# Patient Record
Sex: FEMALE | Race: WHITE | Employment: OTHER | URBAN - METROPOLITAN AREA
[De-identification: names, ages, dates, MRNs, and addresses within clinical notes are randomized per-mention and may not be internally consistent; named-entity substitution may affect disease eponyms.]

---

## 2017-02-12 ENCOUNTER — HOSPITAL ENCOUNTER (OUTPATIENT)
Age: 57
Discharge: HOME | End: 2017-02-12
Attending: ORTHOPAEDIC SURGERY
Payer: COMMERCIAL

## 2017-02-12 DIAGNOSIS — M50.10 CERVICAL DISC DISORDER WITH RADICULOPATHY: ICD-10-CM

## 2017-02-12 PROCEDURE — 72141 MRI NECK SPINE W/O DYE: CPT

## 2017-03-16 ENCOUNTER — HOSPITAL ENCOUNTER (OUTPATIENT)
Age: 57
Setting detail: OUTPATIENT SURGERY
Discharge: HOME | End: 2017-03-16
Attending: ANESTHESIOLOGY | Admitting: ANESTHESIOLOGY
Payer: COMMERCIAL

## 2017-03-16 ENCOUNTER — SURGERY (OUTPATIENT)
Age: 57
End: 2017-03-16

## 2017-03-16 VITALS
WEIGHT: 200 LBS | RESPIRATION RATE: 14 BRPM | HEIGHT: 61 IN | TEMPERATURE: 99.1 F | OXYGEN SATURATION: 96 % | HEART RATE: 87 BPM | BODY MASS INDEX: 37.76 KG/M2 | DIASTOLIC BLOOD PRESSURE: 89 MMHG | SYSTOLIC BLOOD PRESSURE: 147 MMHG

## 2017-03-16 PROCEDURE — 64413 HC INJ ANES CERVICAL PLEXUS: CPT | Performed by: ANESTHESIOLOGY

## 2017-03-16 PROCEDURE — 74011250636 HC RX REV CODE- 250/636: Performed by: ANESTHESIOLOGY

## 2017-03-16 PROCEDURE — 77030014125 HC TY EPDRL BBMI -B: Performed by: ANESTHESIOLOGY

## 2017-03-16 RX ORDER — METHYLPREDNISOLONE ACETATE 80 MG/ML
80 INJECTION, SUSPENSION INTRA-ARTICULAR; INTRALESIONAL; INTRAMUSCULAR; SOFT TISSUE ONCE
Status: COMPLETED | OUTPATIENT
Start: 2017-03-16 | End: 2017-03-16

## 2017-03-16 RX ADMIN — METHYLPREDNISOLONE ACETATE 80 MG: 80 INJECTION, SUSPENSION INTRA-ARTICULAR; INTRALESIONAL; INTRAMUSCULAR; SOFT TISSUE at 08:08

## 2017-03-16 NOTE — DISCHARGE SUMMARY
Cervical Epidural Steroid Injection: sitting, C7-T1, preped with betadine, 18 ga epidural needle, YULISA at 6 cm, CSF/BLOOD negative, 6 ml NS with 80 mg of Depomedrol injected through the epidural needle, tolerated well, no complication. D/C home after 30 min observation.

## 2017-03-16 NOTE — IP AVS SNAPSHOT
Current Discharge Medication List  
  
ASK your doctor about these medications Dose & Instructions Dispensing Information Comments Morning Noon Evening Bedtime  
 amLODIPine 10 mg tablet Commonly known as:  Oliverio Nuñez Your last dose was: Your next dose is: Take  by mouth daily. Refills:  0 CeleBREX 50 mg capsule Generic drug:  Celecoxib Your last dose was: Your next dose is:    
   
   
 Dose:  50 mg Take 50 mg by mouth two (2) times a day. Refills:  0  
     
   
   
   
  
 cyanocobalamin 500 mcg tablet Commonly known as:  VITAMIN B12 Your last dose was: Your next dose is:    
   
   
 Dose:  500 mcg Take 500 mcg by mouth daily. Refills:  0 DEPO-PROVERA 150 mg/mL injection Generic drug:  medroxyPROGESTERone Your last dose was: Your next dose is:    
   
   
 Dose:  150 mg  
150 mg by IntraMUSCular route once. Refills:  0  
     
   
   
   
  
 diclofenac EC 75 mg EC tablet Commonly known as:  VOLTAREN Your last dose was: Your next dose is:    
   
   
  Refills:  0  
     
   
   
   
  
 metaxalone 800 mg tablet Commonly known as:  SKELAXIN Your last dose was: Your next dose is:    
   
   
  Refills:  0  
     
   
   
   
  
 omeprazole 20 mg capsule Commonly known as:  PRILOSEC Your last dose was: Your next dose is:    
   
   
 Dose:  20 mg Take 20 mg by mouth daily. Refills:  0 PREMARIN 0.45 mg tablet Generic drug:  estrogens (conjugated) Your last dose was: Your next dose is: Take  by mouth. Refills:  0 PRISTIQ 50 mg tablet Generic drug:  Desvenlafaxine SR Your last dose was: Your next dose is:    
   
   
 Dose:  50 mg Take 50 mg by mouth. Refills:  0 SYNTHROID 88 mcg tablet Generic drug:  levothyroxine Your last dose was: Your next dose is: Take  by mouth Daily (before breakfast). Refills:  0  
     
   
   
   
  
 traZODone 50 mg tablet Commonly known as:  [place] Your last dose was: Your next dose is:    
   
   
 nightly. Refills:  0

## 2017-03-16 NOTE — PROCEDURES
OPERATIVE NOTE    Patient: Carol Segovia MRN: [id number]  SSN: [id number]    YOB: 1960  Age: 49 y.o. Sex: female        Preoperative Diagnosis: m50.10    Patient is a 49 y.o. female with a history of   Past Medical History:   Diagnosis Date    Acid reflux     Asthma     Cervical neck pain with evidence of disc disease     Flank pain     Kidney stone     Thyroid condition     who presents with neck pain. .      Exam: Shows normal sensation and motor function in the upper extremities. Risks and benefits of Cervical  7 epidural steroid injections were discussed with the patient who consented to proceed. Date of Procedure: 3/16/2017     Surgeon(s): Surgeon(s) and Role:     * Ivelisse Paige MD - Primary    Procedure: Procedure(s):  CERVICAL EPIDURAL  INJECTION  c4-c5 Time out performed. Corrct site and procedure identified. Monitors applied. Placed sitting  chlorhexidine prep and sterile draping applied to the area. Local-1% Lidocaine to skin and subcutaneous tissue C7-T1  With 18 gauge Tuohy needle was directed to Epidural space by loss of resistance to air. No CSF,Heme. 80 mg Depo-medrol suspended in 6 ml of preservative free Normal Saline was injected through the needle. Patient tolerated the procedure well and Vital signs were stable throughout procedure. Patient was then transferred to Phase 2 recovery for observation in stable condition. No complications were noted.     Ivelisse Paige MD  3/16/2017  8:20 AM

## 2017-03-16 NOTE — H&P
Surgery History and Physical    Subjective:      Gretchen Whitten is a 49 y.o. female who presents for evaluation of neck pain. The pain is described as sharp and is 9/10 in intensity today with occ numbness/tingling on the right fingers. . Onset was 18 year ago and had a cervical fusion that time. Symptoms have been getting worse for last six months. Past Medical History:   Diagnosis Date    Acid reflux     Asthma     Cervical neck pain with evidence of disc disease     Flank pain     Kidney stone     Thyroid condition      Past Surgical History:   Procedure Laterality Date    CHEST SURGERY PROCEDURE UNLISTED      pt denies    HX CERVICAL FUSION  2001    HX GASTRIC BYPASS  1998 & 2009    HX GASTRIC BYPASS      HX HERNIA REPAIR  2011    HX TONSILLECTOMY      HX UROLOGICAL  11/12/2014    Cystoscopy, left retrograde pyelogram and left ureteral stent placement    HX UROLOGICAL  11/18/2014    Cystoscopy, bilateral retrograde pyelograms, left ureteroscopy with basketextraction of stones and left ureteral stent placement      Family History   Problem Relation Age of Onset    Heart Disease Father     Hypertension Father      Social History   Substance Use Topics    Smoking status: Never Smoker    Smokeless tobacco: Not on file    Alcohol use Yes      Comment: rare      Prior to Admission medications    Medication Sig Start Date End Date Taking? Authorizing Provider   amLODIPine (NORVASC) 10 mg tablet Take  by mouth daily. Yes Historical Provider   trazodone (DESYREL) 50 mg tablet nightly. 10/3/14  Yes Historical Provider   estrogens, conjugated, (PREMARIN) 0.45 mg tablet Take  by mouth. Yes Historical Provider   levothyroxine (SYNTHROID) 88 mcg tablet Take  by mouth Daily (before breakfast). Yes Historical Provider   Desvenlafaxine SR (PRISTIQ) 50 mg tablet Take 50 mg by mouth. Yes Historical Provider   omeprazole (PRILOSEC) 20 mg capsule Take 20 mg by mouth daily.    Yes Historical Provider cyanocobalamin (VITAMIN B12) 500 mcg tablet Take 500 mcg by mouth daily. Yes Historical Provider   diclofenac EC (VOLTAREN) 75 mg EC tablet  17   Historical Provider   metaxalone (SKELAXIN) 800 mg tablet  10/28/14   Historical Provider   medroxyPROGESTERone (DEPO-PROVERA) 150 mg/mL injection 150 mg by IntraMUSCular route once. Historical Provider   Celecoxib (CELEBREX) 50 mg capsule Take 50 mg by mouth two (2) times a day. Historical Provider      Allergies   Allergen Reactions    Shellfish Derived Nausea and Vomiting     Oysters only    Aspirin Other (comments)    Codeine Nausea Only       Review of Systems:  A comprehensive review of systems was negative except for that written in the History of Present Illness. Objective:      Patient Vitals for the past 8 hrs:   BP Temp Pulse SpO2 Height Weight   17 0800 (!) 161/97 37.3 °C (99.1 °F) 99 98 % 5' 1\" (1.549 m) 90.7 kg (200 lb)       Temp (24hrs), Av.3 °C (99.1 °F), Min:37.3 °C (99.1 °F), Max:37.3 °C (99.1 °F)      Physical Exam:  GENERAL: alert, cooperative, no distress, appears stated age    Assessment:   Cervical Stenosis, failed neck surgery syndrome. stable  Plan:   Cervical epidural steroid injection, #1. Discussed the risk of surgery including headache. Bleeding. Nerve injury,  and the risks of general anesthetic. The patient understands the risks; any and all questions were answered to the patient's satisfaction.     Signed By: Patricia Mckeon MD     2017

## 2017-03-16 NOTE — DISCHARGE INSTRUCTIONS
Patient armband removed and given to patient to take home. Patient was informed of the privacy risks if armband lost or stolen     Myelogram: About This Test  What is it? A myelogram uses X-rays and a special dye to make pictures of bones and nerves of the spine (spinal canal). The spinal canal holds the spinal cord, the spinal nerve roots, and the fluid-filled space between the bones in your spine. Why is this test done? A myelogram is done to check for:  · The cause of arm or leg numbness, weakness, or pain. · Narrowing of the spinal canal (spinal stenosis). · A tumor or infection causing problems with the spinal cord or nerve roots. · A spinal disc that has ruptured (herniated disc). · Inflammation of the membrane that covers the brain and spinal cord. · Problems with the blood vessels to the spine. How can you prepare for the test?  Your doctor will tell you if you need to change how much you eat and drink before the myelogram. You may be asked to increase the amount of water you drink before the test. Follow the instructions your doctor gives you about eating and drinking. Before a myelogram, tell your doctor if:  · You are allergic to any medicines, contrast material, or iodine dye. · You have had bleeding problems, or you take a blood thinner, such as aspirin, clopidogrel (Plavix), or warfarin (Coumadin), or you take over-the-counter medicines, such as ibuprofen (Advil, Motrin) or naproxen (Aleve). Your doctor will tell you when you should stop taking these medicines several days before your procedure. Make sure that you understand exactly what he or she wants you to do. · You have had kidney problems. · You have diabetes, especially if you take metformin (Glucophage, for example). · You are or might be pregnant. Ask someone to take you home and stay with you after the test.  What happens during the test?  The dye is put into your spinal canal with a thin needle.  This is called a lumbar puncture. The dye moves through the space so the nerve roots and spinal cord can be seen more clearly. After the dye is put in, you will lie still while the X-ray pictures are taken. How does it feel? You will feel a quick sting from a small needle that has medicine to numb the skin on your back. You will also feel some pressure as the long, thin spinal needle is put into your spinal canal. You may feel a quick sharp pain down your buttock or leg when the needle is moved in your spine. The dye may make you feel warm and flushed and have a metallic taste in your mouth. What else should you know about the test?  In rare cases, the hole made by the needle in the sac around the spine does not close normally. This can allow spinal fluid to leak out. This leak may need to be repaired through a procedure called an epidural blood patch. To do the patch, your doctor injects some of your own blood to cover the hole. How long does the test take? · A myelogram usually takes 30 minutes to 1 hour. What happens after the test?  · You will probably be able to go home 30 minutes to 2 hours after the test.  · You may need to lie in bed with your head raised for 4 to 24 hours after the test. To prevent seizures, which are a rare side effect, do not bend over or lie down with your head lower than your body. Keeping your head higher than your body after a myelogram also may help prevent or reduce other side effects, such as headache, nausea, or vomiting. · Avoid strenuous activity, such as running or heavy lifting, for at least 1 day after your myelogram.  · Drink plenty of water after the myelogram. Your doctor will give you instructions on taking your regular medicines. When should you call for help? Call 911 anytime you think you may need emergency care. For example, call if:  · You have a seizure.   Call your doctor now or seek immediate medical care if:  · You have any increase in pain, weakness, or numbness in your legs.  · You have a severe headache or stiff neck, or your eyes become very sensitive to light. · You have a headache that lasts longer than 24 hours. · You have problems urinating or having a bowel movement. · You have a fever. Follow-up care is a key part of your treatment and safety. Be sure to make and go to all appointments, and call your doctor if you are having problems. It's also a good idea to keep a list of the medicines you take. Ask your doctor when you can expect to have your test results. Where can you learn more? Go to http://kevin-bhargavi.info/. Enter W537 in the search box to learn more about \"Myelogram: About This Test.\"  Current as of: August 15, 2016  Content Version: 11.1  © 2642-5005 [employer]. Care instructions adapted under license by "[employer] (which disclaims liability or warranty for this information). If you have questions about a medical condition or this instruction, always ask your healthcare professional. [place] [address] any warranty or liability for your use of this information. DISCHARGE SUMMARY from Nurse    The following personal items are in your possession at time of discharge:    Dental Appliances: None  Visual Aid: None     Home Medications: None  Jewelry: Earrings  Clothing: Pants, Shirt, Footwear, Socks, Undergarments  Other Valuables: None (all belongings kept at bedisde)             PATIENT INSTRUCTIONS:    After general anesthesia or intravenous sedation, for 24 hours or while taking prescription Narcotics:  · Limit your activities  · Do not drive and operate hazardous machinery  · Do not make important personal or business decisions  · Do  not drink alcoholic beverages  · If you have not urinated within 8 hours after discharge, please contact your surgeon on call.     Report the following to your surgeon:  · Excessive pain, swelling, redness or odor of or around the surgical area  · Temperature over 100.5  · Nausea and vomiting lasting longer than 4 hours or if unable to take medications  · Any signs of decreased circulation or nerve impairment to extremity: change in color, persistent  numbness, tingling, coldness or increase pain  · Any questions        What to do at Home:  Recommended activity: Activity as tolerated and no driving for today,     *  Please give a list of your current medications to your Primary Care Provider. *  Please update this list whenever your medications are discontinued, doses are      changed, or new medications (including over-the-counter products) are added. *  Please carry medication information at all times in case of emergency situations. These are general instructions for a healthy lifestyle:    No smoking/ No tobacco products/ Avoid exposure to second hand smoke    Surgeon General's Warning:  Quitting smoking now greatly reduces serious risk to your health. Obesity, smoking, and sedentary lifestyle greatly increases your risk for illness    A healthy diet, regular physical exercise & weight monitoring are important for maintaining a healthy lifestyle    You may be retaining fluid if you have a history of heart failure or if you experience any of the following symptoms:  Weight gain of 3 pounds or more overnight or 5 pounds in a week, increased swelling in our hands or feet or shortness of breath while lying flat in bed. Please call your doctor as soon as you notice any of these symptoms; do not wait until your next office visit. Recognize signs and symptoms of STROKE:    F-face looks uneven    A-arms unable to move or move unevenly    S-speech slurred or non-existent    T-time-call 911 as soon as signs and symptoms begin-DO NOT go       Back to bed or wait to see if you get better-TIME IS BRAIN. Warning Signs of HEART ATTACK     Call 911 if you have these symptoms:   Chest discomfort.  Most heart attacks involve discomfort in the center of the chest that lasts more than a few minutes, or that goes away and comes back. It can feel like uncomfortable pressure, squeezing, fullness, or pain.  Discomfort in other areas of the upper body. Symptoms can include pain or discomfort in one or both arms, the back, neck, jaw, or stomach.  Shortness of breath with or without chest discomfort.  Other signs may include breaking out in a cold sweat, nausea, or lightheadedness. Don't wait more than five minutes to call 911 - MINUTES MATTER! Fast action can save your life. Calling 911 is almost always the fastest way to get lifesaving treatment. Emergency Medical Services staff can begin treatment when they arrive -- up to an hour sooner than if someone gets to the hospital by car. The discharge information has been reviewed with the patient. The patient verbalized understanding. Discharge medications reviewed with the patient and appropriate educational materials and side effects teaching were provided.

## 2017-03-16 NOTE — IP AVS SNAPSHOT
Bam Vasquez 
 
 
 [address] [place] [address] Patient: Elver Martinez MRN: [id number] VHD:[id number] You are allergic to the following Allergen Reactions Shellfish Derived Nausea and Vomiting Oysters only Aspirin Other (comments) Codeine Nausea Only Recent Documentation Height Weight BMI OB Status Smoking Status 1.549 m 90.7 kg 37.79 kg/m2 Postmenopausal Never Smoker Emergency Contacts Name Discharge Info Relation Home Work Mobile bi[employer]GIVER [3] Spouse [3] [phone] Lindy April  Spouse [3] [phone] About your hospitalization You were admitted on:  March 16, 2017 You last received care in the:  [address] [facility] [address] You were discharged on:  March 16, 2017 Unit phone number:  [phone] Why you were hospitalized Your primary diagnosis was:  Not on File Providers Seen During Your Hospitalizations Provider Role Specialty Primary office phone Yamile Black MD Attending Provider Anesthesiology [phone] Your Primary Care Physician (PCP) Primary Care Physician Office Phone Office Fax Lynn Bunch [phone] Follow-up Information Follow up With Details Comments Contact Info Adair Carrero MD   [address] [address] 
[phone] Your Appointments Monday April 10, 2017 10:45 AM EDT  
ESTABLISHED PATIENT with Allean Castleman, MD  
[facility] [place] [facility]) [address] [place] [address]  
[phone] Current Discharge Medication List  
  
ASK your doctor about these medications Dose & Instructions Dispensing Information Comments Morning Noon Evening Bedtime  
 amLODIPine 10 mg tablet Commonly known as:  [place] Your last dose was: Your next dose is: Take  by mouth daily. Refills:  0 CeleBREX 50 mg capsule Generic drug:  Celecoxib Your last dose was: Your next dose is:    
   
   
 Dose:  50 mg Take 50 mg by mouth two (2) times a day. Refills:  0  
     
   
   
   
  
 cyanocobalamin 500 mcg tablet Commonly known as:  VITAMIN B12 Your last dose was: Your next dose is:    
   
   
 Dose:  500 mcg Take 500 mcg by mouth daily. Refills:  0 DEPO-PROVERA 150 mg/mL injection Generic drug:  medroxyPROGESTERone Your last dose was: Your next dose is:    
   
   
 Dose:  150 mg  
150 mg by IntraMUSCular route once. Refills:  0  
     
   
   
   
  
 diclofenac EC 75 mg EC tablet Commonly known as:  VOLTAREN Your last dose was: Your next dose is:    
   
   
  Refills:  0  
     
   
   
   
  
 metaxalone 800 mg tablet Commonly known as:  SKELAXIN Your last dose was: Your next dose is:    
   
   
  Refills:  0  
     
   
   
   
  
 omeprazole 20 mg capsule Commonly known as:  PRILOSEC Your last dose was: Your next dose is:    
   
   
 Dose:  20 mg Take 20 mg by mouth daily. Refills:  0 PREMARIN 0.45 mg tablet Generic drug:  estrogens (conjugated) Your last dose was: Your next dose is: Take  by mouth. Refills:  0 PRISTIQ 50 mg tablet Generic drug:  Desvenlafaxine SR Your last dose was: Your next dose is:    
   
   
 Dose:  50 mg Take 50 mg by mouth. Refills:  0  
     
   
   
   
  
 SYNTHROID 88 mcg tablet Generic drug:  levothyroxine Your last dose was: Your next dose is: Take  by mouth Daily (before breakfast). Refills:  0  
     
   
   
   
  
 traZODone 50 mg tablet Commonly known as:  Jace Hay Your last dose was: Your next dose is:    
   
   
 nightly. Refills:  0 Discharge Instructions Patient armband removed and given to patient to take home. Patient was informed of the privacy risks if armband lost or stolen Myelogram: About This Test 
What is it? A myelogram uses X-rays and a special dye to make pictures of bones and nerves of the spine (spinal canal). The spinal canal holds the spinal cord, the spinal nerve roots, and the fluid-filled space between the bones in your spine. Why is this test done? A myelogram is done to check for: · The cause of arm or leg numbness, weakness, or pain. · Narrowing of the spinal canal (spinal stenosis). · A tumor or infection causing problems with the spinal cord or nerve roots. · A spinal disc that has ruptured (herniated disc). · Inflammation of the membrane that covers the brain and spinal cord. · Problems with the blood vessels to the spine. How can you prepare for the test? 
Your doctor will tell you if you need to change how much you eat and drink before the myelogram. You may be asked to increase the amount of water you drink before the test. Follow the instructions your doctor gives you about eating and drinking. Before a myelogram, tell your doctor if: 
· You are allergic to any medicines, contrast material, or iodine dye. · You have had bleeding problems, or you take a blood thinner, such as aspirin, clopidogrel (Plavix), or warfarin (Coumadin), or you take over-the-counter medicines, such as ibuprofen (Advil, Motrin) or naproxen (Aleve). Your doctor will tell you when you should stop taking these medicines several days before your procedure. Make sure that you understand exactly what he or she wants you to do. · You have had kidney problems. · You have diabetes, especially if you take metformin (Glucophage, for example). · You are or might be pregnant. Ask someone to take you home and stay with you after the test. 
What happens during the test? 
The dye is put into your spinal canal with a thin needle. This is called a lumbar puncture. The dye moves through the space so the nerve roots and spinal cord can be seen more clearly. After the dye is put in, you will lie still while the X-ray pictures are taken. How does it feel? You will feel a quick sting from a small needle that has medicine to numb the skin on your back. You will also feel some pressure as the long, thin spinal needle is put into your spinal canal. You may feel a quick sharp pain down your buttock or leg when the needle is moved in your spine. The dye may make you feel warm and flushed and have a metallic taste in your mouth. What else should you know about the test? 
In rare cases, the hole made by the needle in the sac around the spine does not close normally. This can allow spinal fluid to leak out. This leak may need to be repaired through a procedure called an epidural blood patch. To do the patch, your doctor injects some of your own blood to cover the hole. How long does the test take? · A myelogram usually takes 30 minutes to 1 hour. What happens after the test? 
· You will probably be able to go home 30 minutes to 2 hours after the test. 
· You may need to lie in bed with your head raised for 4 to 24 hours after the test. To prevent seizures, which are a rare side effect, do not bend over or lie down with your head lower than your body. Keeping your head higher than your body after a myelogram also may help prevent or reduce other side effects, such as headache, nausea, or vomiting. · Avoid strenuous activity, such as running or heavy lifting, for at least 1 day after your myelogram. 
· Drink plenty of water after the myelogram. Your doctor will give you instructions on taking your regular medicines. When should you call for help? Call 911 anytime you think you may need emergency care. For example, call if: 
· You have a seizure. Call your doctor now or seek immediate medical care if: 
· You have any increase in pain, weakness, or numbness in your legs. · You have a severe headache or stiff neck, or your eyes become very sensitive to light. · You have a headache that lasts longer than 24 hours. · You have problems urinating or having a bowel movement. · You have a fever. Follow-up care is a key part of your treatment and safety. Be sure to make and go to all appointments, and call your doctor if you are having problems. It's also a good idea to keep a list of the medicines you take. Ask your doctor when you can expect to have your test results. Where can you learn more? Go to http://kevin-bhargavi.info/. Enter P770 in the search box to learn more about \"Myelogram: About This Test.\" Current as of: August 15, 2016 Content Version: 11.1 © 4188-3494 [employer]wise[employer]orporated. Care instructions adapted under license by [employer] (which disclaims liability or warranty for this information). If you have questions about a medical condition or this instruction, always ask your healthcare professional. [place] [address] any warranty or liability for your use of this information. DISCHARGE SUMMARY from Nurse The following personal items are in your possession at time of discharge: 
 
Dental Appliances: None Visual Aid: None Home Medications: None Jewelry: Kraig July Clothing: Pants, Shirt, Footwear, Socks, Undergarments Other Valuables: None (all belongings kept at [facility]) PATIENT INSTRUCTIONS: 
 
 
F-face looks uneven A-arms unable to move or move unevenly S-speech slurred or non-existent T-time-call 911 as soon as signs and symptoms begin-DO NOT go Back to bed or wait to see if you get better-TIME IS BRAIN. Warning Signs of HEART ATTACK Call 911 if you have these symptoms: 
? Chest discomfort. Most heart attacks involve discomfort in the center of the chest that lasts more than a few minutes, or that goes away and comes back. It can feel like uncomfortable pressure, squeezing, fullness, or pain. ? Discomfort in other areas of the upper body. Symptoms can include pain or discomfort in one or both arms, the back, neck, jaw, or stomach. ? Shortness of breath with or without chest discomfort. ? Other signs may include breaking out in a cold sweat, nausea, or lightheadedness. Don't wait more than five minutes to call [address]! Fast action can save your life. Calling 911 is almost always the fastest way to get lifesaving treatment. Emergency Medical Services staff can begin treatment when they arrive  up to an hour sooner than if someone gets to the hospital by car. The discharge information has been reviewed with the patient. The patient verbalized understanding. Discharge medications reviewed with the patient and appropriate educational materials and side effects teaching were provided. Discharge Orders None [email or web address] Announcement We are excited to announce that we are making your provider's discharge notes available to you in [email or web address]. You will see these notes when they are completed and signed by the physician that discharged you from your recent hospital stay. If you have any questions or concerns about any information you see in Dish[employer]fm, please call the Health Information Department where you were seen or reach out to your Primary Care Provider for more information about your plan of care. Introducing [facility]!    
 [employer] introduces D[employer] patient portal. Now you can access parts of your medical record, email your doctor's office, and request medication refills online. 1. In your internet browser, go to [email or web address] 2. Click on the First Time User? Click Here link in the Sign In box. You will see the New Member Sign Up page. 3. Enter your The RealReal Access Code exactly as it appears below. You will not need to use this code after youve completed the sign-up process. If you do not sign up before the expiration date, you must request a new code. · The Real[employer] Access Code: [email or web address] Expires: 5/18/2017  1:32 PM 
 
4. Enter the last four digits of your Social Security Number (xxxx) and Date of Birth (mm/dd/yyyy) as indicated and click Submit. You will be taken to the next sign-up page. 5. Create a The RealReal ID. This will be your The RealReal login ID and cannot be changed, so think of one that is secure and easy to remember. 6. Create a The RealReal password. You can change your password at any time. 7. Enter your Password Reset Question and Answer. This can be used at a later time if you forget your password. 8. Enter your e-mail address. You will receive e-mail notification when new information is available in [address] 9. Click Sign Up. You can now view and download portions of your medical record. 10. Click the Download Summary menu link to download a portable copy of your medical information. If you have questions, please visit the Frequently Asked Questions section of the The RealReal website. Remember, The RealReal is NOT to be used for urgent needs. For medical emergencies, dial 911. Now available from your iPhone and Android! General Information Please provide this summary of care documentation to your next provider. Patient Signature:  ____________________________________________________________ Date:  ____________________________________________________________  
  
[facility]  Provider Signature: ____________________________________________________________ Date:  ____________________________________________________________

## 2017-03-16 NOTE — PERIOP NOTES
, Jamal Melo, ok to speak to. Handoff report given to LATHA Zuleta. Informed pt still needs pre procedure VS recorded.

## 2017-05-18 ENCOUNTER — HOSPITAL ENCOUNTER (OUTPATIENT)
Dept: GENERAL RADIOLOGY | Age: 57
Discharge: HOME | End: 2017-05-18
Payer: COMMERCIAL

## 2017-05-18 DIAGNOSIS — J18.9 PNEUMONIA, ORGANISM UNSPECIFIED(486): ICD-10-CM

## 2017-05-18 PROCEDURE — 71020 XR CHEST PA LAT: CPT

## 2017-09-20 ENCOUNTER — HOSPITAL ENCOUNTER (OUTPATIENT)
Dept: LAB | Age: 57
Discharge: HOME | End: 2017-09-20
Payer: COMMERCIAL

## 2017-09-20 PROCEDURE — 80307 DRUG TEST PRSMV CHEM ANLYZR: CPT

## 2017-09-25 LAB
Lab: NORMAL
REFERENCE LAB,REFLB: NORMAL
TEST DESCRIPTION:,ATST: NORMAL

## 2018-01-24 RX ORDER — ERGOCALCIFEROL 1.25 MG/1
50000 CAPSULE ORAL
COMMUNITY

## 2018-01-24 RX ORDER — HYDROCHLOROTHIAZIDE 12.5 MG/1
12.5 TABLET ORAL DAILY
COMMUNITY

## 2018-01-24 RX ORDER — HYDROCODONE BITARTRATE AND ACETAMINOPHEN 5; 325 MG/1; MG/1
TABLET ORAL
COMMUNITY

## 2018-01-25 ENCOUNTER — ANESTHESIA EVENT (OUTPATIENT)
Dept: SURGERY | Age: 58
End: 2018-01-25

## 2018-01-25 ENCOUNTER — ANESTHESIA (OUTPATIENT)
Dept: SURGERY | Age: 58
End: 2018-01-25

## 2018-01-25 ENCOUNTER — HOSPITAL ENCOUNTER (OUTPATIENT)
Age: 58
Setting detail: OUTPATIENT SURGERY
Discharge: HOME | End: 2018-01-25
Attending: ANESTHESIOLOGY | Admitting: ANESTHESIOLOGY
Payer: COMMERCIAL

## 2018-01-25 VITALS
TEMPERATURE: 98.4 F | HEART RATE: 70 BPM | HEIGHT: 61 IN | OXYGEN SATURATION: 97 % | RESPIRATION RATE: 16 BRPM | SYSTOLIC BLOOD PRESSURE: 157 MMHG | WEIGHT: 207 LBS | DIASTOLIC BLOOD PRESSURE: 82 MMHG | BODY MASS INDEX: 39.08 KG/M2

## 2018-01-25 PROCEDURE — 74011250636 HC RX REV CODE- 250/636: Performed by: ANESTHESIOLOGY

## 2018-01-25 PROCEDURE — 77030014125 HC TY EPDRL BBMI -B: Performed by: ANESTHESIOLOGY

## 2018-01-25 PROCEDURE — 64413 HC INJ ANES CERVICAL PLEXUS: CPT | Performed by: ANESTHESIOLOGY

## 2018-01-25 RX ORDER — FLUCONAZOLE 100 MG/1
200 TABLET ORAL DAILY
COMMUNITY

## 2018-01-25 RX ORDER — METHYLPREDNISOLONE ACETATE 80 MG/ML
80 INJECTION, SUSPENSION INTRA-ARTICULAR; INTRALESIONAL; INTRAMUSCULAR; SOFT TISSUE ONCE
Status: COMPLETED | OUTPATIENT
Start: 2018-01-25 | End: 2018-01-25

## 2018-01-25 RX ADMIN — METHYLPREDNISOLONE ACETATE 80 MG: 80 INJECTION, SUSPENSION INTRA-ARTICULAR; INTRALESIONAL; INTRAMUSCULAR; SOFT TISSUE at 07:53

## 2018-01-25 NOTE — H&P
Surgery History and Physical    Subjective:      Ramsey Saul is a 56 y.o. female who presents for evaluation of neck pain. The pain is described as sharp and is 9/10 in intensity. Onset was 4 month ago. Symptoms have been unchanged since. She had one cervical epidural steroid injection about 1 year ago with good pain relief for 6 months. Past Medical History:   Diagnosis Date    Acid reflux     Anemia     Asthma     Cervical neck pain with evidence of disc disease     DVT (deep venous thrombosis) (HCC)     Esophageal reflux     Flank pain     GERD (gastroesophageal reflux disease)     Gross hematuria     Hematuria     Hypertension     Hypothyroidism     Kidney stone     Morbid obesity ([address]     Nausea with vomiting     Thyroid condition      Past Surgical History:   Procedure Laterality Date    CHEST SURGERY PROCEDURE UNLISTED      pt denies    HX CERVICAL FUSION  2001    HX CHOLECYSTECTOMY  1998    ZCHOLECYSTECTOMY    HX GASTRIC BYPASS  1998 & 2009    HX GASTRIC BYPASS      HX HERNIA REPAIR  2011    HX TONSILLECTOMY      HX UROLOGICAL  11/12/2014    Cystoscopy, left retrograde pyelogram and left ureteral stent placement    HX UROLOGICAL  11/18/2014    Cystoscopy, bilateral retrograde pyelograms, left ureteroscopy with basketextraction of stones and left ureteral stent placement      Family History   Problem Relation Age of Onset    Heart Disease Father     Hypertension Father      Social History   Substance Use Topics    Smoking status: Never Smoker    Smokeless tobacco: Never Used    Alcohol use Yes      Comment: rare      Prior to Admission medications    Medication Sig Start Date End Date Taking? Authorizing Provider   fluconazole (DIFLUCAN) 100 mg tablet Take 200 mg by mouth daily. FDA advises cautious prescribing of oral fluconazole in pregnancy. Yes Historical Provider   hydroCHLOROthiazide (HYDRODIURIL) 12.5 mg tablet Take 12.5 mg by mouth daily.    Yes Historical Provider   ergocalciferol (VITAMIN D2) 50,000 unit capsule Take 50,000 Units by mouth every seven (7) days. Yes Historical Provider   HYDROcodone-acetaminophen (NORCO) 5-325 mg per tablet Take  by mouth every four (4) hours as needed for Pain. Yes Historical Provider   amLODIPine (NORVASC) 10 mg tablet Take  by mouth daily. Yes Historical Provider   trazodone (DESYREL) 50 mg tablet nightly. 10/3/14  Yes Historical Provider   estrogens, conjugated, (PREMARIN) 0.45 mg tablet Take  by mouth. Yes Historical Provider   levothyroxine (SYNTHROID) 88 mcg tablet Take  by mouth Daily (before breakfast). Yes Historical Provider   Desvenlafaxine SR (PRISTIQ) 50 mg tablet Take 50 mg by mouth. Yes Historical Provider   omeprazole (PRILOSEC) 20 mg capsule Take 20 mg by mouth daily. Yes Historical Provider   cyanocobalamin (VITAMIN B12) 500 mcg tablet Take 500 mcg by mouth daily. Yes Historical Provider      Allergies   Allergen Reactions    Shellfish Derived Nausea and Vomiting     Oysters only    Aspirin Other (comments)    Codeine Nausea Only       Review of Systems:  A comprehensive review of systems was negative except for that written in the History of Present Illness. Objective:      Patient Vitals for the past 8 hrs:   BP Temp Pulse Resp SpO2 Height Weight   18 0726 149/78 36.9 °C (98.4 °F) 70 16 97 % - -   18 0718 - - - - - 5' 1\" (1.549 m) 93.9 kg (207 lb)       Temp (24hrs), Av.9 °C (98.4 °F), Min:36.9 °C (98.4 °F), Max:36.9 °C (98.4 °F)      Physical Exam:  GENERAL: alert, cooperative, no distress, appears stated age    Assessment:   Cervical stenosis. stable  Plan:   Cervical epidural steroid injection. Discussed the risk of surgery including headache. Bleeding. Nerve injury,  and the risks of general anesthetic. The patient understands the risks; any and all questions were answered to the patient's satisfaction.     Signed By: Nikolas Tran MD     2018

## 2018-01-25 NOTE — PROCEDURES
OPERATIVE NOTE    Patient: Ancelmo Cheney MRN: [id number]  SSN: [id number]    YOB: 1960  Age: 56 y.o. Sex: female        Preoperative Diagnosis: m54.12    Patient is a 56 y.o. female with a history of   Past Medical History:   Diagnosis Date    Acid reflux     Anemia     Asthma     Cervical neck pain with evidence of disc disease     DVT (deep venous thrombosis) (HCC)     Esophageal reflux     Flank pain     GERD (gastroesophageal reflux disease)     Gross hematuria     Hematuria     Hypertension     Hypothyroidism     Kidney stone     Morbid obesity (HCC)     Nausea with vomiting     Thyroid condition     who presents with neck pain. Exam: Shows normal sensation and motor function in the upper extremities. Risks and benefits of Cervical  7 epidural steroid injections were discussed with the patient who consented to proceed. Date of Procedure: 1/25/2018     Surgeon(s): Surgeon(s) and Role:     * Rikki Marion MD - Primary    Procedure: Procedure(s):  CERVICAL EPIDURAL  INJECTION Time out performed. Corrct site and procedure identified. Monitors applied. Placed sitting  chlorhexidine prep and sterile draping applied to the area. Local-1% Lidocaine to skin and subcutaneous tissue C7-T1  With 18 gauge Tuohy needle was directed to Epidural space by loss of resistance to air. No CSF,Heme. 80 mg Depomedrol suspended in 6 ml of preservative free Normal Saline was injected through the needle. Patient tolerated the procedure well and Vital signs were stable throughout procedure. Patient was then transferred to Phase 2 recovery for observation in stable condition. No complications were noted.     Rikki Marion MD  1/25/2018  8:02 AM

## 2018-01-25 NOTE — IP AVS SNAPSHOT
[address] Patient: Nelly Mckeon MRN: [id number] About your hospitalization You were admitted on:  January 25, 2018 You last received care in the:  [address] [place] [address] You were discharged on:  January 25, 2018 Why you were hospitalized Your primary diagnosis was:  Not on File Follow-up Information None Discharge Orders None A check daysi indicates which time of day the medication should be taken. My Medications ASK your doctor about these medications Instructions Each Dose to Equal  
 Morning Noon Evening Bedtime  
 amLODIPine 10 mg tablet Commonly known as:  Yanique Tyshawn Your last dose was: Your next dose is: Take  by mouth daily. cyanocobalamin 500 mcg tablet Commonly known as:  VITAMIN B12 Your last dose was: Your next dose is: Take 500 mcg by mouth daily. 500 mcg DIFLUCAN 100 mg tablet Generic drug:  fluconazole Your last dose was: Your next dose is: Take 200 mg by mouth daily. FDA advises cautious prescribing of oral fluconazole in pregnancy. 200 mg  
    
   
   
   
  
 hydroCHLOROthiazide 12.5 mg tablet Commonly known as:  HYDRODIURIL Your last dose was: Your next dose is: Take 12.5 mg by mouth daily. 12.5 mg  
    
   
   
   
  
 HYDROcodone-acetaminophen 5-325 mg per tablet Commonly known as:  Irlanda Fling Your last dose was: Your next dose is: Take  by mouth every four (4) hours as needed for Pain. omeprazole 20 mg capsule Commonly known as:  PRILOSEC Your last dose was: Your next dose is: Take 20 mg by mouth daily. 20 mg PREMARIN 0.45 mg tablet Generic drug:  estrogens (conjugated) Your last dose was: Your next dose is: Take  by mouth. PRISTIQ 50 mg ER tablet Generic drug:  desvenlafaxine succinate Your last dose was: Your next dose is: Take 50 mg by mouth. 50 mg SYNTHROID 88 mcg tablet Generic drug:  levothyroxine Your last dose was: Your next dose is: Take  by mouth Daily (before breakfast). traZODone 50 mg tablet Commonly known as:  Nohemi Kelley Your last dose was: Your next dose is:    
   
   
 nightly. VITAMIN D2 50,000 unit capsule Generic drug:  ergocalciferol Your last dose was: Your next dose is: Take 50,000 Units by mouth every seven (7) days. [address] Units Discharge Instructions Cervical Epidural Injection: What to Expect at Home Your Recovery During your cervical epidural injection, your doctor injected medicine into the area around the spinal cord in your neck. This is to help with pain, tingling, or numbness in your neck or down your arm. The steroid medicine in the injection should start to help your pain in 1 to 5 days. Steroids don't always work. When they do work, the pain relief can last for several days to a few months or longer. Your injection may also have included a numbing medicine that works right away for a short time. Before the steroid starts to work, your neck or arm may be sore for a few days. This care sheet gives you a general idea about how long it will take for you to recover. But each person recovers at a different pace. Follow the steps below to get better as quickly as possible. How can you care for yourself at home? Activity ? · You may want to do less than normal for a few days. But you may also be able to return to your daily routine. ? · You may shower if your doctor okays it. Do not take a bath for the first 24 hours, or until your doctor tells you it is okay. Diet ? · You can eat your normal diet. Medicines ? · Your doctor will tell you if and when you can restart your medicines. He or she will also give you instructions about taking any new medicines. ? · If you take blood thinners, such as warfarin (Coumadin), clopidogrel (Plavix), or aspirin, be sure to talk to your doctor. He or she will tell you if and when to start taking those medicines again. Make sure that you understand exactly what your doctor wants you to do. ? · Be safe with medicines. Read and follow all instructions on the label. ¨ If the doctor gave you a prescription medicine for pain, take it as prescribed. ¨ If you are not taking a prescription pain medicine, ask your doctor if you can take an over-the-counter medicine. ¨ If you normally take a blood thinner, be sure to get instructions about when to start taking it again. ?Ice ? · If the site of your injection feels sore or tender, put ice or a cold pack on it for 10 to 20 minutes at a time. Put a thin cloth between the ice and your skin. Follow-up care is a key part of your treatment and safety. Be sure to make and go to all appointments, and call your doctor if you are having problems. It's also a good idea to know your test results and keep a list of the medicines you take. When should you call for help? Call 911 anytime you think you may need emergency care. For example, call if: 
? · You passed out (lost consciousness). ? · You have trouble breathing. ? · You are unable to move an arm or a leg at all. ?Call your doctor now or seek immediate medical care if: 
? · You have new or worse symptoms in your arms, legs, chest, belly, or buttocks. Symptoms may include: ¨ Numbness or tingling. ¨ Weakness. ¨ Pain. ? · You lose bladder or bowel control. ? · You have signs of infection, such as: ¨ Increased pain, swelling, warmth, or redness. ¨ Red streaks leading from the area. ¨ Pus draining from the area. ¨ A fever. ? Watch closely for any changes in your health, and be sure to contact your doctor if: 
? · You do not get better as expected. Where can you learn more? Go to http://kevin-bhargavi.info/. Enter K313 in the search box to learn more about \"Cervical Epidural Injection: What to Expect at Home. \" Current as of: March 21, 2017 Content Version: 11.4 © 0426-7427 [employer]. Care instructions adapted under license by [employer] (which disclaims liability or warranty for this information). If you have questions about a medical condition or this instruction, always ask your healthcare professional. [place] [address] any warranty or liability for your use of this information. Patient armband removed and given to patient to take home. Patient was informed of the privacy risks if armband lost or stolen DISCHARGE SUMMARY from Nurse PATIENT INSTRUCTIONS: 
 
After general anesthesia or intravenous sedation, for 24 hours or while taking prescription Narcotics: · Limit your activities · Do not drive and operate hazardous machinery · Do not make important personal or business decisions · Do  not drink alcoholic beverages · If you have not urinated within 8 hours after discharge, please contact your surgeon on call. Report the following to your surgeon: 
· Excessive pain, swelling, redness or odor of or around the surgical area · Temperature over 100.5 · Nausea and vomiting lasting longer than 4 hours or if unable to take medications · Any signs of decreased circulation or nerve impairment to extremity: change in color, persistent  numbness, tingling, coldness or increase pain · Any questions What to do at Home: 
Recommended activity: Activity as tolerated and Activity as tolerated and no driving for today,  
 
 
 
 *  Please give a list of your current medications to your Primary Care Provider. *  Please update this list whenever your medications are discontinued, doses are 
    changed, or new medications (including over-the-counter products) are added. *  Please carry medication information at all times in case of emergency situations. These are general instructions for a healthy lifestyle: No smoking/ No tobacco products/ Avoid exposure to second hand smoke Surgeon General's Warning:  Quitting smoking now greatly reduces serious risk to your health. Obesity, smoking, and sedentary lifestyle greatly increases your risk for illness A healthy diet, regular physical exercise & weight monitoring are important for maintaining a healthy lifestyle You may be retaining fluid if you have a history of heart failure or if you experience any of the following symptoms:  Weight gain of 3 pounds or more overnight or 5 pounds in a week, increased swelling in our hands or feet or shortness of breath while lying flat in bed. Please call your doctor as soon as you notice any of these symptoms; do not wait until your next office visit. Recognize signs and symptoms of STROKE: 
 
F-face looks uneven A-arms unable to move or move unevenly S-speech slurred or non-existent T-time-call 911 as soon as signs and symptoms begin-DO NOT go Back to bed or wait to see if you get better-TIME IS BRAIN. Warning Signs of HEART ATTACK Call 911 if you have these symptoms: 
? Chest discomfort. Most heart attacks involve discomfort in the center of the chest that lasts more than a few minutes, or that goes away and comes back. It can feel like uncomfortable pressure, squeezing, fullness, or pain. ? Discomfort in other areas of the upper body. Symptoms can include pain or discomfort in one or both arms, the back, neck, jaw, or stomach. ? Shortness of breath with or without chest discomfort. ? Other signs may include breaking out in a cold sweat, nausea, or lightheadedness. Don't wait more than five minutes to call [address]! Fast action can save your life. Calling 911 is almost always the fastest way to get lifesaving treatment. Emergency Medical Services staff can begin treatment when they arrive  up to an hour sooner than if someone gets to the hospital by car. The discharge information has been reviewed with the patient. The patient verbalized understanding. Discharge medications reviewed with the patient and appropriate educational materials and side effects teaching were provided. ___________________________________________________________________________________________________________________________________ Introducing [facility]! Dear Galen Cantu: 
Thank you for requesting a [employer] account. Our records indicate that you already have an active [employer] account. You can access your account anytime at [email or web address] Did you know that you can access your hospital and ER discharge instructions at any time in [employer]? You can also review all of your test results from your hospital stay or ER visit. Additional Information If you have questions, please visit the Frequently Asked Questions section of the [employer] website at [email or web address] Remember, [employer] is NOT to be used for urgent needs. For medical emergencies, dial 911. Now available from your iPhone and Android! Providers Seen During Your Hospitalization Provider Specialty Primary office phone Yaima Temple MD Anesthesiology [phone] Your Primary Care Physician (PCP) Primary Care Physician Office Phone Office Fax [facility] [phone] You are allergic to the following Allergen Reactions Shellfish Derived Nausea and Vomiting Oysters only Aspirin Other (comments) Codeine Nausea Only Recent Documentation Height Weight BMI OB Status Smoking Status 1.549 m 93.9 kg 39.11 kg/m2 Postmenopausal Never Smoker Emergency Contacts Name Discharge Info Relation Home Work Mobile Stone Naidu CAREGIVER [3] Spouse [3] [phone] Patient Belongings The following personal items are in your possession at time of discharge: 
  Dental Appliances: None             Jewelry: Earrings  Clothing: Footwear, Socks, Shirt, Pants    Other Valuables: None Please provide this summary of care documentation to your next provider. Signatures-by signing, you are acknowledging that this After Visit Summary has been reviewed with you and you have received a copy. Patient Signature:  ____________________________________________________________ Date:  ____________________________________________________________  
  
[facility] Signature:  ____________________________________________________________ Date:  ____________________________________________________________

## 2018-01-25 NOTE — DISCHARGE INSTRUCTIONS
Cervical Epidural Injection: What to Expect at [address] [place] [address]    During your cervical epidural injection, your doctor injected medicine into the area around the spinal cord in your neck. This is to help with pain, tingling, or numbness in your neck or down your arm. The steroid medicine in the injection should start to help your pain in 1 to 5 days. Steroids don't always work. When they do work, the pain relief can last for several days to a few months or longer. Your injection may also have included a numbing medicine that works right away for a short time. Before the steroid starts to work, your neck or arm may be sore for a few days. This care sheet gives you a general idea about how long it will take for you to recover. But each person recovers at a different pace. Follow the steps below to get better as quickly as possible. How can you care for yourself at home? Activity  ? · You may want to do less than normal for a few days. But you may also be able to return to your daily routine. ? · You may shower if your doctor okays it. Do not take a bath for the first 24 hours, or until your doctor tells you it is okay. Diet  ? · You can eat your normal diet. Medicines  ? · Your doctor will tell you if and when you can restart your medicines. He or she will also give you instructions about taking any new medicines. ? · If you take blood thinners, such as warfarin (Coumadin), clopidogrel (Plavix), or aspirin, be sure to talk to your doctor. He or she will tell you if and when to start taking those medicines again. Make sure that you understand exactly what your doctor wants you to do. ? · Be safe with medicines. Read and follow all instructions on the label. ¨ If the doctor gave you a prescription medicine for pain, take it as prescribed. ¨ If you are not taking a prescription pain medicine, ask your doctor if you can take an over-the-counter medicine.   ¨ If you normally take a blood thinner, be sure to get instructions about when to start taking it again. ?Ice  ? · If the site of your injection feels sore or tender, put ice or a cold pack on it for 10 to 20 minutes at a time. Put a thin cloth between the ice and your skin. Follow-up care is a key part of your treatment and safety. Be sure to make and go to all appointments, and call your doctor if you are having problems. It's also a good idea to know your test results and keep a list of the medicines you take. When should you call for help? Call 911 anytime you think you may need emergency care. For example, call if:  ? · You passed out (lost consciousness). ? · You have trouble breathing. ? · You are unable to move an arm or a leg at all. ?Call your doctor now or seek immediate medical care if:  ? · You have new or worse symptoms in your arms, legs, chest, belly, or buttocks. Symptoms may include:  ¨ Numbness or tingling. ¨ Weakness. ¨ Pain. ? · You lose bladder or bowel control. ? · You have signs of infection, such as:  ¨ Increased pain, swelling, warmth, or redness. ¨ Red streaks leading from the area. ¨ Pus draining from the area. ¨ A fever. ? Watch closely for any changes in your health, and be sure to contact your doctor if:  ? · You do not get better as expected. Where can you learn more? Go to http://kevin-bhargavi.info/. Enter V361 in the search box to learn more about \"Cervical Epidural Injection: What to Expect at Home. \"  Current as of: March 21, 2017  Content Version: 11.4  © 6697-5927 [employer]. Care instructions adapted under license by [employer] (which disclaims liability or warranty for this information). If you have questions about a medical condition or this instruction, always ask your healthcare professional. [place] [address] any warranty or liability for your use of this information. Patient armband removed and given to patient to take home. Patient was informed of the privacy risks if armband lost or stolen    DISCHARGE SUMMARY from Nurse    PATIENT INSTRUCTIONS:    After general anesthesia or intravenous sedation, for 24 hours or while taking prescription Narcotics:  · Limit your activities  · Do not drive and operate hazardous machinery  · Do not make important personal or business decisions  · Do  not drink alcoholic beverages  · If you have not urinated within 8 hours after discharge, please contact your surgeon on call. Report the following to your surgeon:  · Excessive pain, swelling, redness or odor of or around the surgical area  · Temperature over 100.5  · Nausea and vomiting lasting longer than 4 hours or if unable to take medications  · Any signs of decreased circulation or nerve impairment to extremity: change in color, persistent  numbness, tingling, coldness or increase pain  · Any questions    What to do at Home:  Recommended activity: Activity as tolerated and Activity as tolerated and no driving for today,         *  Please give a list of your current medications to your Primary Care Provider. *  Please update this list whenever your medications are discontinued, doses are      changed, or new medications (including over-the-counter products) are added. *  Please carry medication information at all times in case of emergency situations. These are general instructions for a healthy lifestyle:    No smoking/ No tobacco products/ Avoid exposure to second hand smoke  Surgeon General's Warning:  Quitting smoking now greatly reduces serious risk to your health.     Obesity, smoking, and sedentary lifestyle greatly increases your risk for illness    A healthy diet, regular physical exercise & weight monitoring are important for maintaining a healthy lifestyle    You may be retaining fluid if you have a history of heart failure or if you experience any of the following symptoms:  Weight gain of 3 pounds or more overnight or 5 pounds in a week, increased swelling in our hands or feet or shortness of breath while lying flat in bed. Please call your doctor as soon as you notice any of these symptoms; do not wait until your next office visit. Recognize signs and symptoms of STROKE:    F-face looks uneven    A-arms unable to move or move unevenly    S-speech slurred or non-existent    T-time-call 911 as soon as signs and symptoms begin-DO NOT go       Back to bed or wait to see if you get better-TIME IS BRAIN. Warning Signs of HEART ATTACK     Call 911 if you have these symptoms:   Chest discomfort. Most heart attacks involve discomfort in the center of the chest that lasts more than a few minutes, or that goes away and comes back. It can feel like uncomfortable pressure, squeezing, fullness, or pain.  Discomfort in other areas of the upper body. Symptoms can include pain or discomfort in one or both arms, the back, neck, jaw, or stomach.  Shortness of breath with or without chest discomfort.  Other signs may include breaking out in a cold sweat, nausea, or lightheadedness. Don't wait more than five minutes to call 911 - MINUTES MATTER! Fast action can save your life. Calling 911 is almost always the fastest way to get lifesaving treatment. Emergency Medical Services staff can begin treatment when they arrive -- up to an hour sooner than if someone gets to the hospital by car. The discharge information has been reviewed with the patient. The patient verbalized understanding. Discharge medications reviewed with the patient and appropriate educational materials and side effects teaching were provided.   ___________________________________________________________________________________________________________________________________

## 2018-05-30 ENCOUNTER — HOSPITAL ENCOUNTER (OUTPATIENT)
Age: 58
Discharge: HOME | End: 2018-05-30
Attending: PHYSICIAN ASSISTANT
Payer: COMMERCIAL

## 2018-05-30 DIAGNOSIS — M50.30 DEGENERATIVE DISC DISEASE, CERVICAL: ICD-10-CM

## 2018-05-30 DIAGNOSIS — M54.12 CERVICAL RADICULOPATHY: ICD-10-CM

## 2018-05-30 PROCEDURE — 72141 MRI NECK SPINE W/O DYE: CPT

## (undated) DEVICE — STERILE POLYISOPRENE POWDER-FREE SURGICAL GLOVES: Brand: PROTEXIS

## (undated) DEVICE — TRAY EPI CATH 20GA NDL 18GA L3.5IN 5ML CONT W/ TUOHY CLS